# Patient Record
Sex: FEMALE | Race: WHITE | Employment: PART TIME | URBAN - NONMETROPOLITAN AREA
[De-identification: names, ages, dates, MRNs, and addresses within clinical notes are randomized per-mention and may not be internally consistent; named-entity substitution may affect disease eponyms.]

---

## 2021-02-24 ENCOUNTER — TELEPHONE (OUTPATIENT)
Dept: CARDIOLOGY CLINIC | Age: 76
End: 2021-02-24

## 2021-02-24 NOTE — TELEPHONE ENCOUNTER
Patient's podiatrist called Dr. Sue Spence and wanted patient to see Dr. Sue Spence due to discoloration in his toes. I called patient and appt made. She confirmed appt date, time and location.

## 2021-02-25 ENCOUNTER — OFFICE VISIT (OUTPATIENT)
Dept: CARDIOLOGY CLINIC | Age: 76
End: 2021-02-25
Payer: MEDICARE

## 2021-02-25 VITALS
BODY MASS INDEX: 25.56 KG/M2 | DIASTOLIC BLOOD PRESSURE: 72 MMHG | HEIGHT: 65 IN | SYSTOLIC BLOOD PRESSURE: 136 MMHG | HEART RATE: 84 BPM | WEIGHT: 153.4 LBS

## 2021-02-25 DIAGNOSIS — R06.02 SOB (SHORTNESS OF BREATH): Primary | ICD-10-CM

## 2021-02-25 DIAGNOSIS — R94.31 ABNORMAL EKG: ICD-10-CM

## 2021-02-25 DIAGNOSIS — L81.9 DISCOLORATION OF SKIN OF TOE: ICD-10-CM

## 2021-02-25 PROCEDURE — 99204 OFFICE O/P NEW MOD 45 MIN: CPT | Performed by: INTERNAL MEDICINE

## 2021-02-25 PROCEDURE — 93000 ELECTROCARDIOGRAM COMPLETE: CPT | Performed by: INTERNAL MEDICINE

## 2021-02-25 RX ORDER — TRAMADOL HYDROCHLORIDE 50 MG/1
50 TABLET ORAL 4 TIMES DAILY
COMMUNITY

## 2021-02-25 NOTE — PROGRESS NOTES
[facility] [address] [place] [address]  Dept: [phone]  Dept Fax: [phone]  Loc: [phone]    Visit Date: 2/25/2021    Ms. Rosetta Mcintosh is a 77 y.o. female  who presented for:  Chief Complaint   Patient presents with    New Patient     referred by Dr. Tracey Perez; toe discoloration with pain       HPI:   HPI   New patient who is 78 yo with pmhx of anxiety is coming in for discoloration of toes and bilateral pain. Patinet states she has discoloration of R toes starting 1 month ago and L toes starting 2 weeks ago. Discoloration does get worse when foot is down and gets lighter however completely not pale when elevated. Patient states she also has shooting pain down the foot. SULY was done that showed normal ankle-bracial indices but abnormal toe-bracial indces. Patient denies any temperature chage, swelling, nonhealing ulcer or woudns. Was a smoker for 30+ year but has quit for about 20 years now. Denies any there complaints. No a/t. No trauma to the toes. Bed sheets cause hypersensitivity. Discoloration is bluish primarily at the tips of the toes. She is taking Tramadol. She has no hx of Afib, no recent vascular instrumentation. Denies any hx of AAA. She has no pain in her knees, but has a history of bilateral knee replacements. No hx of aneurysm in herself or family. SULY on the R 1.1, L 1.0 and TBI on the R 0.32, L 0.37. Current Outpatient Medications:     traMADol (ULTRAM) 50 MG tablet, Take 50 mg by mouth 4 times daily. , Disp: , Rfl:     busPIRone (BUSPAR) 15 MG tablet, Take 15 mg by mouth 2 times daily , Disp: , Rfl:     Past Medical History  Earlyne Dana  has a past medical history of Anxiety and Back pain. Social History  Earlyne Dana  reports that she quit smoking about 16 years ago. She does not have any smokeless tobacco history on file. She reports that she does not drink alcohol or use drugs. Family History  [facility] family history is not on file. There is no family history of bicuspid aortic valve, aneurysms, heart transplant, pacemakers, defibrillators, or sudden cardiac death. Past Surgical History   Past Surgical History:   Procedure Laterality Date    APPENDECTOMY      CATARACT REMOVAL  2016    HYSTERECTOMY      JOINT REPLACEMENT Bilateral     WRIST SURGERY Right     CYST       Review of Systems   Constitutional: Negative for chills and fever  HENT: Negative for congestion, sinus pressure, sneezing and sore throat. Eyes: Negative for pain, discharge, redness and itching. Respiratory: Negative for apnea, cough  Gastrointestinal: Negative for blood in stool, constipation, diarrhea   Endocrine: Negative for cold intolerance, heat intolerance, polydipsia. Genitourinary: Negative for dysuria, enuresis, flank pain and hematuria. Musculoskeletal: Negative for arthralgias, joint swelling and neck pain. Neurological: Negative for numbness and headaches. Psychiatric/Behavioral: Negative for agitation, confusion, decreased concentration and dysphoric mood. Objective:     /72   Pulse 84   Ht 5' 5\" (1.651 m)   Wt 153 lb 6.4 oz (69.6 kg)   BMI 25.53 kg/m²     Wt Readings from Last 3 Encounters:   02/25/21 153 lb 6.4 oz (69.6 kg)   07/23/15 163 lb 14.4 oz (74.3 kg)     BP Readings from Last 3 Encounters:   02/25/21 136/72   07/23/15 117/72       Nursing note and vitals reviewed. Physical Exam   Constitutional: Oriented to person, place, and time. Appears well-developed and well-nourished. HENT:   Head: Normocephalic and atraumatic. Eyes: EOM are normal. Pupils are equal, round, and reactive to light. Neck: Normal range of motion. Neck supple. No JVD present. Cardiovascular: Normal rate, regular rhythm, normal heart sounds and intact distal pulses. No murmur heard. Pulmonary/Chest: Effort normal and breath sounds normal. No respiratory distress. No wheezes. No rales. Abdominal: Soft. Bowel sounds are normal. No distension. There is no tenderness. Musculoskeletal: Normal range of motion. No edema. Bluish discolation in toes bilaterally, mainly at the tips, appears superficial, tender to touch, focal, not all of the toes, spotty. Neurological: Alert and oriented to person, place, and time. No cranial nerve deficit. Coordination normal.   Skin: Skin is warm and dry. Psychiatric: Normal mood and affect. No results found for: CKTOTAL, CKMB, CKMBINDEX    No results found for: WBC, RBC, HGB, HCT, MCV, MCH, MCHC, RDW, PLT, MPV    No results found for: NA, K, CL, CO2, BUN, LABALBU, CREATININE, CALCIUM, GFRAA, LABGLOM, GLUCOSE    No results found for: ALKPHOS, ALT, AST, PROT, BILITOT, BILIDIR, IBILI, LABALBU    No results found for: MG    No results found for: INR, PROTIME      No results found for: LABA1C    No results found for: TRIG, HDL, LDLCALC, LDLDIRECT, LABVLDL    No results found for: TSH      Testing Reviewed:      I have individually reviewed the cardiac test below:    ECHO: No results found for this or any previous visit. Assessment/Plan   ?  Cholesterol Emboli or thrombotic emboli  Blue Toe Syndrome  Abnormal toe-brachial indices  Normal Ankle-brachial indices  Blateral foot pain  Hx of smoking The findings are most consistent with embolization but unclear source. Thrombosis vs cholesterol. Discussed that we need further work-up before we proceed with treating. Pain control is adequate. Will check cardiac event monitor, CBC with diff, TTE/CONCHA,  Angiogram to evaluate for any proximal disease/aneurysmal disease that could need intervention to prevent continued issues, FLP, and UA with eosinophils. If worsening symptoms, will present to the ED for further evaluation. Ultimately, if this is cholesterol emboli, then the treatment is RF modification, ASA, statin therapy. Discussed diet/exercise/BP/weight loss/health lifestyle choices/lipids; the patient understands the goals and will try to comply. Disposition:  Testing then follow-up    Electronically signed by Todd Sykes DO   2/25/2021 at 2:49 PM EST    Attending Supervising Physician's [address] Statement  I performed a history and physical examination on the patient and discussed the management with the resident physician. I reviewed and agree with the findings and plan as documented in the resident's note.     Electronically signed by Diane Hernandez MD on 2/26/21 at 7:20 AM EST

## 2021-03-01 ENCOUNTER — TELEPHONE (OUTPATIENT)
Dept: CARDIOLOGY CLINIC | Age: 76
End: 2021-03-01

## 2021-03-01 NOTE — TELEPHONE ENCOUNTER
Lab faxed to [address] [place] [address] lab   They stated they had a UA order   Pt will go back to lab and get a new kit for a redo

## 2021-03-11 ENCOUNTER — PREP FOR PROCEDURE (OUTPATIENT)
Dept: CARDIOLOGY | Age: 76
End: 2021-03-11

## 2021-03-11 RX ORDER — SODIUM CHLORIDE 0.9 % (FLUSH) 0.9 %
10 SYRINGE (ML) INJECTION EVERY 12 HOURS SCHEDULED
Status: CANCELLED | OUTPATIENT
Start: 2021-03-11

## 2021-03-11 RX ORDER — SODIUM CHLORIDE 0.9 % (FLUSH) 0.9 %
10 SYRINGE (ML) INJECTION PRN
Status: CANCELLED | OUTPATIENT
Start: 2021-03-11

## 2021-03-11 RX ORDER — SODIUM CHLORIDE 9 MG/ML
50 INJECTION, SOLUTION INTRAVENOUS CONTINUOUS
Status: CANCELLED | OUTPATIENT
Start: 2021-03-11

## 2021-03-12 ENCOUNTER — HOSPITAL ENCOUNTER (OUTPATIENT)
Dept: NON INVASIVE DIAGNOSTICS | Age: 76
Discharge: HOME | End: 2021-03-12
Payer: MEDICARE

## 2021-03-12 ENCOUNTER — HOSPITAL ENCOUNTER (OUTPATIENT)
Dept: INTERVENTIONAL RADIOLOGY/VASCULAR | Age: 76
Discharge: HOME | End: 2021-03-12
Attending: INTERNAL MEDICINE | Admitting: INTERNAL MEDICINE
Payer: MEDICARE

## 2021-03-12 VITALS
WEIGHT: 150 LBS | BODY MASS INDEX: 24.99 KG/M2 | TEMPERATURE: 98.4 F | HEART RATE: 87 BPM | DIASTOLIC BLOOD PRESSURE: 79 MMHG | SYSTOLIC BLOOD PRESSURE: 146 MMHG | HEIGHT: 65 IN | OXYGEN SATURATION: 100 % | RESPIRATION RATE: 17 BRPM

## 2021-03-12 DIAGNOSIS — R06.02 SOB (SHORTNESS OF BREATH): ICD-10-CM

## 2021-03-12 DIAGNOSIS — L81.9 DISCOLORATION OF SKIN OF TOE: ICD-10-CM

## 2021-03-12 DIAGNOSIS — R94.31 ABNORMAL EKG: ICD-10-CM

## 2021-03-12 LAB
ABO: NORMAL
ALBUMIN SERPL-MCNC: 3.9 G/DL (ref 3.5–5.1)
ALP BLD-CCNC: 53 U/L (ref 38–126)
ALT SERPL-CCNC: 19 U/L (ref 11–66)
ANION GAP SERPL CALCULATED.3IONS-SCNC: 9 MEQ/L (ref 8–16)
ANTIBODY SCREEN: NORMAL
APTT: 31.7 SECONDS (ref 22–38)
AST SERPL-CCNC: 26 U/L (ref 5–40)
BILIRUB SERPL-MCNC: 0.4 MG/DL (ref 0.3–1.2)
BUN BLDV-MCNC: 27 MG/DL (ref 7–22)
CALCIUM SERPL-MCNC: 9 MG/DL (ref 8.5–10.5)
CHLORIDE BLD-SCNC: 106 MEQ/L (ref 98–111)
CHOLESTEROL, TOTAL: 198 MG/DL (ref 100–199)
CO2: 27 MEQ/L (ref 23–33)
CREAT SERPL-MCNC: 0.8 MG/DL (ref 0.4–1.2)
ERYTHROCYTE [DISTWIDTH] IN BLOOD BY AUTOMATED COUNT: 14.4 % (ref 11.5–14.5)
ERYTHROCYTE [DISTWIDTH] IN BLOOD BY AUTOMATED COUNT: 47.3 FL (ref 35–45)
GFR SERPL CREATININE-BSD FRML MDRD: 70 ML/MIN/1.73M2
GLUCOSE BLD-MCNC: 84 MG/DL (ref 70–108)
HCT VFR BLD CALC: 40.8 % (ref 37–47)
HDLC SERPL-MCNC: 90 MG/DL
HEMOGLOBIN: 12.7 GM/DL (ref 12–16)
INR BLD: 1.02 (ref 0.85–1.13)
LDL CHOLESTEROL CALCULATED: 97 MG/DL
MCH RBC QN AUTO: 28.2 PG (ref 26–33)
MCHC RBC AUTO-ENTMCNC: 31.1 GM/DL (ref 32.2–35.5)
MCV RBC AUTO: 90.7 FL (ref 81–99)
PLATELET # BLD: 257 THOU/MM3 (ref 130–400)
PMV BLD AUTO: 10.2 FL (ref 9.4–12.4)
POTASSIUM REFLEX MAGNESIUM: 4.2 MEQ/L (ref 3.5–5.2)
RBC # BLD: 4.5 MILL/MM3 (ref 4.2–5.4)
RH FACTOR: NORMAL
SODIUM BLD-SCNC: 142 MEQ/L (ref 135–145)
TOTAL PROTEIN: 6.4 G/DL (ref 6.1–8)
TRIGL SERPL-MCNC: 57 MG/DL (ref 0–199)
WBC # BLD: 5.6 THOU/MM3 (ref 4.8–10.8)

## 2021-03-12 PROCEDURE — 6360000002 HC RX W HCPCS: Performed by: INTERNAL MEDICINE

## 2021-03-12 PROCEDURE — 80053 COMPREHEN METABOLIC PANEL: CPT

## 2021-03-12 PROCEDURE — 6360000002 HC RX W HCPCS

## 2021-03-12 PROCEDURE — 85610 PROTHROMBIN TIME: CPT

## 2021-03-12 PROCEDURE — 93270 REMOTE 30 DAY ECG REV/REPORT: CPT

## 2021-03-12 PROCEDURE — C1894 INTRO/SHEATH, NON-LASER: HCPCS

## 2021-03-12 PROCEDURE — 2580000003 HC RX 258: Performed by: NURSE PRACTITIONER

## 2021-03-12 PROCEDURE — 86900 BLOOD TYPING SEROLOGIC ABO: CPT

## 2021-03-12 PROCEDURE — 75774 ARTERY X-RAY EACH VESSEL: CPT | Performed by: INTERNAL MEDICINE

## 2021-03-12 PROCEDURE — 86850 RBC ANTIBODY SCREEN: CPT

## 2021-03-12 PROCEDURE — 86901 BLOOD TYPING SEROLOGIC RH(D): CPT

## 2021-03-12 PROCEDURE — 36415 COLL VENOUS BLD VENIPUNCTURE: CPT

## 2021-03-12 PROCEDURE — 6360000004 HC RX CONTRAST MEDICATION: Performed by: INTERNAL MEDICINE

## 2021-03-12 PROCEDURE — 75625 CONTRAST EXAM ABDOMINL AORTA: CPT | Performed by: INTERNAL MEDICINE

## 2021-03-12 PROCEDURE — 36247 INS CATH ABD/L-EXT ART 3RD: CPT | Performed by: INTERNAL MEDICINE

## 2021-03-12 PROCEDURE — 80061 LIPID PANEL: CPT

## 2021-03-12 PROCEDURE — 2500000003 HC RX 250 WO HCPCS

## 2021-03-12 PROCEDURE — 85730 THROMBOPLASTIN TIME PARTIAL: CPT

## 2021-03-12 PROCEDURE — 2709999900 HC NON-CHARGEABLE SUPPLY

## 2021-03-12 PROCEDURE — 75716 ARTERY X-RAYS ARMS/LEGS: CPT | Performed by: INTERNAL MEDICINE

## 2021-03-12 PROCEDURE — C1769 GUIDE WIRE: HCPCS

## 2021-03-12 PROCEDURE — 85027 COMPLETE CBC AUTOMATED: CPT

## 2021-03-12 PROCEDURE — 75630 X-RAY AORTA LEG ARTERIES: CPT | Performed by: INTERNAL MEDICINE

## 2021-03-12 RX ORDER — RIVAROXABAN 2.5 MG/1
2.5 TABLET, FILM COATED ORAL 2 TIMES DAILY
Qty: 60 TABLET | Refills: 3 | Status: SHIPPED | OUTPATIENT
Start: 2021-03-12 | End: 2021-03-26 | Stop reason: SDUPTHER

## 2021-03-12 RX ORDER — SODIUM CHLORIDE 0.9 % (FLUSH) 0.9 %
10 SYRINGE (ML) INJECTION EVERY 12 HOURS SCHEDULED
Status: DISCONTINUED | OUTPATIENT
Start: 2021-03-12 | End: 2021-03-12 | Stop reason: HOSPADM

## 2021-03-12 RX ORDER — ATORVASTATIN CALCIUM 40 MG/1
40 TABLET, FILM COATED ORAL DAILY
Qty: 30 TABLET | Refills: 3 | Status: SHIPPED | OUTPATIENT
Start: 2021-03-12 | End: 2021-03-26 | Stop reason: SDUPTHER

## 2021-03-12 RX ORDER — ACETAMINOPHEN 325 MG/1
650 TABLET ORAL EVERY 4 HOURS PRN
Status: DISCONTINUED | OUTPATIENT
Start: 2021-03-12 | End: 2021-03-12 | Stop reason: HOSPADM

## 2021-03-12 RX ORDER — SODIUM CHLORIDE 9 MG/ML
INJECTION, SOLUTION INTRAVENOUS CONTINUOUS
Status: DISCONTINUED | OUTPATIENT
Start: 2021-03-12 | End: 2021-03-12 | Stop reason: HOSPADM

## 2021-03-12 RX ORDER — SODIUM CHLORIDE 0.9 % (FLUSH) 0.9 %
10 SYRINGE (ML) INJECTION EVERY 12 HOURS SCHEDULED
Status: DISCONTINUED | OUTPATIENT
Start: 2021-03-12 | End: 2021-03-12 | Stop reason: ALTCHOICE

## 2021-03-12 RX ORDER — ASPIRIN 81 MG/1
81 TABLET ORAL DAILY
Qty: 90 TABLET | Refills: 1 | Status: SHIPPED | OUTPATIENT
Start: 2021-03-12 | End: 2021-03-26 | Stop reason: SDUPTHER

## 2021-03-12 RX ORDER — ISOSORBIDE MONONITRATE 30 MG/1
30 TABLET, EXTENDED RELEASE ORAL DAILY
Qty: 30 TABLET | Refills: 3 | Status: SHIPPED | OUTPATIENT
Start: 2021-03-12 | End: 2021-03-26 | Stop reason: SDUPTHER

## 2021-03-12 RX ORDER — SODIUM CHLORIDE 0.9 % (FLUSH) 0.9 %
10 SYRINGE (ML) INJECTION PRN
Status: DISCONTINUED | OUTPATIENT
Start: 2021-03-12 | End: 2021-03-12 | Stop reason: ALTCHOICE

## 2021-03-12 RX ORDER — AMLODIPINE BESYLATE 2.5 MG/1
2.5 TABLET ORAL DAILY
Qty: 30 TABLET | Refills: 3 | Status: SHIPPED | OUTPATIENT
Start: 2021-03-12 | End: 2021-03-26 | Stop reason: SDUPTHER

## 2021-03-12 RX ORDER — SODIUM CHLORIDE 0.9 % (FLUSH) 0.9 %
10 SYRINGE (ML) INJECTION PRN
Status: DISCONTINUED | OUTPATIENT
Start: 2021-03-12 | End: 2021-03-12 | Stop reason: HOSPADM

## 2021-03-12 RX ORDER — SODIUM CHLORIDE 9 MG/ML
50 INJECTION, SOLUTION INTRAVENOUS CONTINUOUS
Status: DISCONTINUED | OUTPATIENT
Start: 2021-03-12 | End: 2021-03-12 | Stop reason: ALTCHOICE

## 2021-03-12 RX ORDER — ATROPINE SULFATE 0.4 MG/ML
0.5 AMPUL (ML) INJECTION
Status: DISCONTINUED | OUTPATIENT
Start: 2021-03-12 | End: 2021-03-12 | Stop reason: HOSPADM

## 2021-03-12 RX ORDER — MIDAZOLAM HYDROCHLORIDE 2 MG/2ML
1 INJECTION, SOLUTION INTRAMUSCULAR; INTRAVENOUS ONCE
Status: COMPLETED | OUTPATIENT
Start: 2021-03-12 | End: 2021-03-12

## 2021-03-12 RX ORDER — CLOPIDOGREL BISULFATE 75 MG/1
75 TABLET ORAL DAILY
Qty: 30 TABLET | Refills: 3 | Status: SHIPPED | OUTPATIENT
Start: 2021-03-12 | End: 2021-03-26 | Stop reason: SDUPTHER

## 2021-03-12 RX ORDER — FENTANYL CITRATE 50 UG/ML
50 INJECTION, SOLUTION INTRAMUSCULAR; INTRAVENOUS ONCE
Status: COMPLETED | OUTPATIENT
Start: 2021-03-12 | End: 2021-03-12

## 2021-03-12 RX ADMIN — SODIUM CHLORIDE 50 ML/HR: 9 INJECTION, SOLUTION INTRAVENOUS at 09:46

## 2021-03-12 RX ADMIN — MIDAZOLAM 1 MG: 1 INJECTION INTRAMUSCULAR; INTRAVENOUS at 14:50

## 2021-03-12 RX ADMIN — IOPAMIDOL 100 ML: 612 INJECTION, SOLUTION INTRAVENOUS at 15:19

## 2021-03-12 RX ADMIN — FENTANYL CITRATE 50 MCG: 50 INJECTION INTRAMUSCULAR; INTRAVENOUS at 14:49

## 2021-03-12 NOTE — PRE SEDATION
[address] [address]  Sedation/Analgesia History & Physical    Pt Name: Magaly Mariano  Account number: [de-identified]  MRN: [id number]  YOB: 1945  Provider Performing Procedure: Raymond Lemus MD  Referring Provider: Raymond Lemus MD   Primary Care Physician: Diana Lopez  Date: 3/12/2021    PRE-PROCEDURE    Code Status: FULL CODE  Brief History/Pre-Procedure Diagnosis:   ? Cholesterol embolic  Blue color changes of the toes    Consent: : I have discussed with the patient risks, benefits, and alternatives (and relevant risks, benefits, and side effects related to alternatives or not receiving care), and likelihood of the success. The patient and/or representative appear to understand and agree to proceed. The discussion encompasses risks, benefits, and side effects related to the alternatives and the risks related to not receiving the proposed care, treatment, and services. The indication, risks and benefits of the procedure and possible therapeutic consequences and alternatives were discussed with the patient. The patient was given the opportunity to ask questions and to have them answered to his/her satisfaction. Risks of the procedure include but are not limited to the following: Bleeding, hematoma including retroperitoneal hemmorhage, infection, pain and discomfort, injury to the aorta and other blood vessels, rhythm disturbance, low blood pressure, myocardial infarction, stroke, kidney damage/failure, myocardial perforation, allergic reactions to sedatives/contrast material, loss of pulse/vascular compromise requiring surgery, aneurysm/pseudoaneurysm formation, possible loss of a limb/hand/leg, needing blood transfusion, requiring emergent open heart surgery or emergent coronary intervention, the need for intubation/respiratory support, the requirement for defibrillation/cardioversion, and death. Alternatives to and omission of the suggested procedure were discussed.  The patient had no further questions and wished to proceed; the consent form was signed. MEDICAL HISTORY  []ASHD/ANGINA/MI/CHF   []Hypertension  []Diabetes  []Hyperlipidemia  []Smoking  []Family Hx of ASHD  [x]Additional information:       has a past medical history of Anxiety and Back pain. SURGICAL HISTORY   has a past surgical history that includes joint replacement (Bilateral); Wrist surgery (Right); Hysterectomy; Appendectomy; and Cataract removal (2016). Additional information:       ALLERGIES   Allergies as of 03/12/2021    (No Known Allergies)     Additional information:       MEDICATIONS   Aspirin  [x] 81 mg  [] 325 mg  [] None  Antiplatelet drug therapy use last 5 days  [x]No []Yes  Coumadin Use Last 5 Days [x]No []Yes  Other anticoagulant use last 5 days  [x]No []Yes    Current Facility-Administered Medications:     0.9 % sodium chloride infusion, 50 mL/hr, Intravenous, Continuous, Dorlene Kurt, APRN - CNP, Last Rate: 50 mL/hr at 03/12/21 0946, 50 mL/hr at 03/12/21 0946    sodium chloride flush 0.9 % injection 10 mL, 10 mL, Intravenous, 2 times per day, Dorlene Kurt, APRN - CNP    sodium chloride flush 0.9 % injection 10 mL, 10 mL, Intravenous, PRN, Dorlene Kurt, APRN - CNP    midazolam PF (VERSED) injection 1 mg, 1 mg, Intravenous, Once, Gloria Shaver MD    fentaNYL (SUBLIMAZE) injection 50 mcg, 50 mcg, Intravenous, Once, Gloria Shaver MD    iopamidol (ISOVUE-300) 61 % injection 200 mL, 200 mL, Intravenous, ONCE PRN, Gloria Shaver MD  Prior to Admission medications    Medication Sig Start Date End Date Taking? Authorizing Provider   traMADol (ULTRAM) 50 MG tablet Take 50 mg by mouth 4 times daily.    Yes Historical Provider, MD   busPIRone (BUSPAR) 15 MG tablet Take 15 mg by mouth 2 times daily    Yes Historical Provider, MD     Additional information:       VITAL SIGNS   Vitals:    03/12/21 0918   BP: (!) 148/68   Pulse:    Resp:    Temp:    SpO2: PHYSICAL:   General: No acute distress  HEENT:  Unremarkable for age  Neck: without increased JVD, carotid pulses 2+ bilaterally without bruits  Heart: RRR, S1 & S2 WNL, S4 gallop, without murmurs or rubs   NYHA: 2}   Lungs: Clear to auscultation    Abdomen: BS present, without HSM, masses, or tendernes    Extremities: without C,C,E.  Pulses 2+ bilaterally  Mental Status: Alert & Oriented        PLANNED PROCEDURE   []Cath  []PCI                []Pacemaker/AICD  []CONCHA             []Cardioversion [x]Peripheral angiography/PTA  []Other:      SEDATION  Planned agent:[x]Midazolam []Meperidine [x]Sublimaze []Morphine  []Diazepam  []Other:     ASA Classification:  []1 []2 [x]3 []4 []5  Class 1: A normal healthy patient  Class 2: Pt with mild to moderate systemic disease  Class 3: Severe systemic disease or disturbance  Class 4: Severe systemic disorders that are already life threatening. Class 5: Moribund pt with little chances of survival, for more than 24 hours. Mallampati I Airway Classification:   []1 [x]2 []3 []4    [x]Pre-procedure diagnostic studies complete and results available. Comment:    [x]Previous sedation/anesthesia experiences assessed. Comment:    [x]The patient is an appropriate candidate to undergo the planned procedure sedation and anesthesia. (Refer to nursing sedation/analgesia documentation record)  [x]Formulation and discussion of sedation/procedure plan, risks, and expectations with patient and/or responsible adult completed. [x]Patient examined immediately prior to the procedure.  (Refer to nursing sedation/analgesia documentation record)    Otto Whittaker MD   Electronically signed 3/12/2021 at 1:36 PM

## 2021-03-12 NOTE — FLOWSHEET NOTE
Returned to room from [facility] with dressing to right groin that is dry and intact w/o s/s of bleeding or hematoma. Good circulation/sensation, as previously noted, on arrival to unit. Denied c/o pain or discomfort. Skin warm and dry. Iv infusing w/o s/s of infiltration.

## 2021-03-12 NOTE — BRIEF OP NOTE
[facility]  Sedation/Analgesia Post Sedation Record    Pt Name: Jenna Oneal  Account number: [de-identified]  MRN: [id number]  YOB: 1945  Procedure Performed By: Kacie Drew, [address]  Primary Care Physician: Vladimir Ortiz  Date: 3/12/2021    POST-PROCEDURE    Physicians/Assistants: KAILA Pickering MD    Procedure Performed:Peripheral Angiogram      Sedation/Anesthesia: Versed/ Fentanyl and 2% xylocaine local anesthesia. Estimated Blood Loss: < 50 ml. Specimens Removed: None         Disposition of Specimen: N/A        Complications: No Immediate Complications.        Post-procedure Diagnosis/Findings:     Abd aorta with atheromatous disease  No significant inflow/outflow/trifurcation disease  Distal pedal disease in both feet    Likely related to cholesterol embolization               KAILA Pickering MD  Electronically signed 3/12/2021 at 3:19 PM  Interventional Cardiology

## 2021-03-12 NOTE — PROGRESS NOTES
1420 Patient received in IR for procedure with family taken to main radiology. 1425 This procedure has been fully reviewed with the patient and written informed consent has been obtained. Dr. Pk Arreola in; spoke to patient. 1452 Procedure started with Dr. Pk Arreola. 1453 Access left femoral.   1515 Family called to consult room. Procedure completed; patient tolerated well.   1522 Sheath pulled. Manual pressure held. Dr. Pk Arreola spoke to patient's family. Report called to 2E.  1557 Manual pressure released. Gauze and op site to left femoral site; area soft to touch with no bleeding noted. 1600 All 4 pedal pulses located by doppler by RT Feli.  1602 Patient on bed; comfort ensured. Left femoral dressing remains dry and intact with area soft. [id number] Patient taken to 2E via bed with family at bedside. Left femoral dressing remains dry and intact with area soft.

## 2021-03-13 NOTE — PROCEDURES
30 day cardiac event monitor applied. Instructions given. Prep taught. NO further questions.   Jamin, CCT

## 2021-03-15 NOTE — PROCEDURES
[address] [place] [address]                            CARDIAC CATHETERIZATION    PATIENT NAME: Belkis Arce                 :        1945  MED REC NO:   [id number]                           ROOM:       0009  ACCOUNT NO:   [de-identified]                           ADMIT DATE: 2021  PROVIDER:     Jahaira Piña MD    DATE OF PROCEDURE:  2021    INDICATION FOR PROCEDURE:  Blue toe syndrome concerning for cholesterol  embolization. PROCEDURE:  After informed consent was obtained from the patient, she  was brought to the special procedure suite and prepped in the sterile  fashion. The left femoral artery was chosen as primary point of access. Preprocedure timeout was completed. After infiltration to the left  inguinal region with 2% lidocaine using micropuncture and modified  Seldinger technique, fluoroscopic guidance, and ultrasound guidance, I  was able to insert a 5-Mozambican sheath in the left femoral artery. Angiography was performed after inserting a 5-Mozambican VCF catheter over  0.035 angled Glidewire into the abdominal aorta. PERIPHERAL ANGIOGRAM:  ABDOMINAL AORTA:  Diffuse atheromatous disease throughout the entire  distal abdominal aorta. There is significant plaque and heterogeneity  with atheroma on the abdominal wall. No significant aneurysm noted. RENAL ARTERIES:  Difficult to opacify. Would recommend  catheter-directed angiography. COMMON ILIAC ARTERIES:  Bilaterally patent. EXTERNAL ILIAC ARTERIES:  Bilaterally patent; however, the right common  iliac artery has atheromatous disease in the proximal segment. COMMON FEMORAL ARTERIES:  Bilaterally patent. SFA:  Bilaterally patent. POPLITEAL ARTERIES:  Bilaterally patent. TIBIOPERONEAL ARTERIES:  Three-vessel runoff to the ankle bilaterally. I then used a 5-Mozambican VCF catheter and 0.035 angled Glidewire.   I  attempted to cross over, but I could not. I ultimately used a 5-Kenyan  IM catheter. I was able to cross them and place the catheter in the  distal SFA. I did dedicate angiography behind the knee prosthesis to  ensure that there was no popliteal artery aneurysm, which there was not. Distal vessel runoff was unchanged; however, pedal runoff was poor. LEFT PEDAL ARTERIES:  Very slow flow into the left foot, but there is  flow beyond the ankle. It appears to be consistent with distal  embolization. RIGHT PEDAL ARTERIES:  Very slow flow into the right foot. There is  flow to the ankle and flow into the foot but again appears to be  consistent with distal microvascular disease or embolization. IMMEDIATE COMPLICATIONS:  None. MEDICATIONS:  Please see EMR. ACCESS:  Manual pressure was used for hemostasis. ESTIMATED BLOOD LOSS:  Less than 10 mL. SUMMARY:  Findings consistent with small-vessel microvasculature disease  in the distal foot that is most consistent with likely cholesterol  embolization, especially in the setting of a diffusely diseased aorta  as well as the findings of bilateral blue toe syndrome. PLAN:  1. Bedrest.  2.  Start optimal medical therapy. 3.  Risk factor management. 4.  Routine access site care. 5.  Manual pressure for the groin. 6.  Start guideline-directed therapy for likely cholesterol  embolization. 7.  We will start aspirin, statin, Plavix, Xarelto 2.5 mg, Imdur, and  calcium channel blockers. 8.  She is to watch the foot to ensure there is no progressive wound or  lesion noted. 9.  She will follow up with her podiatrist.  10.  If there is any progression in wounds or color change, she is to  notify us immediately. 11.  I did explain to her that no further intervention is likely going  to solve her issue. The hope is that we would stabilize any atheroma and  prevent any further embolization. 12.   She may have continued pain, and there is even chance for distal  digital necrosis requiring toe amputation; however, she continues to  have a pulse and a warm foot all the way into the tip of the toes, which  hopefully will improve with medical therapy. 13.  IV fluids. 14.  Follow up two to four weeks postprocedure. All the above was explained to the patient and the patient's family. They are agreeable and amenable to the above plan.         Verner Basket, MD    D: 03/15/2021 6:42:18       T: 03/15/2021 8:44:25     [email or web address]  Job#: [id number]     Doc#: [id number]    CC:

## 2021-03-16 ENCOUNTER — TELEPHONE (OUTPATIENT)
Dept: CARDIOLOGY CLINIC | Age: 76
End: 2021-03-16

## 2021-03-16 ENCOUNTER — CARE COORDINATION (OUTPATIENT)
Dept: CARE COORDINATION | Age: 76
End: 2021-03-16

## 2021-03-16 NOTE — CARE COORDINATION
I spoke with Dank (son and POA) regarding patient assistance on Elle's Xarelto. I explained the program and guidelines to him, he is going to check with her to see how much she has spent at the pharmacy so far. He is going to reach back out to me when she has met the guidelines and I will start the application process for her.     Luigi Orr [facility]  [address] [place]   Medication Assistance  [facility]    [phone])[phone])[phone]

## 2021-03-16 NOTE — TELEPHONE ENCOUNTER
Pt son asking if pt can get any help with the cost of xarelto, as pt was put on 7 new medications. Radha Pelayo can you help pt?

## 2021-03-26 RX ORDER — ATORVASTATIN CALCIUM 40 MG/1
40 TABLET, FILM COATED ORAL DAILY
Qty: 90 TABLET | Refills: 1 | Status: SHIPPED | OUTPATIENT
Start: 2021-03-26 | End: 2021-04-09 | Stop reason: SDUPTHER

## 2021-03-26 RX ORDER — ISOSORBIDE MONONITRATE 30 MG/1
30 TABLET, EXTENDED RELEASE ORAL DAILY
Qty: 90 TABLET | Refills: 1 | Status: SHIPPED | OUTPATIENT
Start: 2021-03-26 | End: 2021-04-09 | Stop reason: SDUPTHER

## 2021-03-26 RX ORDER — RIVAROXABAN 2.5 MG/1
2.5 TABLET, FILM COATED ORAL 2 TIMES DAILY
Qty: 180 TABLET | Refills: 0 | Status: SHIPPED | OUTPATIENT
Start: 2021-03-26 | End: 2021-04-09 | Stop reason: SDUPTHER

## 2021-03-26 RX ORDER — ASPIRIN 81 MG/1
81 TABLET ORAL DAILY
Qty: 90 TABLET | Refills: 1 | Status: SHIPPED | OUTPATIENT
Start: 2021-03-26

## 2021-03-26 RX ORDER — AMLODIPINE BESYLATE 2.5 MG/1
2.5 TABLET ORAL DAILY
Qty: 90 TABLET | Refills: 1 | Status: SHIPPED | OUTPATIENT
Start: 2021-03-26 | End: 2021-04-09 | Stop reason: SDUPTHER

## 2021-03-26 RX ORDER — CLOPIDOGREL BISULFATE 75 MG/1
75 TABLET ORAL DAILY
Qty: 90 TABLET | Refills: 2 | Status: SHIPPED | OUTPATIENT
Start: 2021-03-26 | End: 2021-04-09 | Stop reason: SDUPTHER

## 2021-03-31 ENCOUNTER — TELEPHONE (OUTPATIENT)
Dept: CARDIOLOGY CLINIC | Age: 76
End: 2021-03-31

## 2021-04-10 RX ORDER — ATORVASTATIN CALCIUM 40 MG/1
40 TABLET, FILM COATED ORAL DAILY
Qty: 4 TABLET | Refills: 0 | OUTPATIENT
Start: 2021-04-10

## 2021-04-10 RX ORDER — CLOPIDOGREL BISULFATE 75 MG/1
75 TABLET ORAL DAILY
Qty: 4 TABLET | Refills: 0 | OUTPATIENT
Start: 2021-04-10

## 2021-04-10 RX ORDER — RIVAROXABAN 2.5 MG/1
2.5 TABLET, FILM COATED ORAL 2 TIMES DAILY
Qty: 8 TABLET | Refills: 0 | OUTPATIENT
Start: 2021-04-10 | End: 2021-07-01 | Stop reason: ALTCHOICE

## 2021-04-10 RX ORDER — ISOSORBIDE MONONITRATE 30 MG/1
30 TABLET, EXTENDED RELEASE ORAL DAILY
Qty: 4 TABLET | Refills: 0 | OUTPATIENT
Start: 2021-04-10

## 2021-04-10 RX ORDER — AMLODIPINE BESYLATE 2.5 MG/1
2.5 TABLET ORAL DAILY
Qty: 4 TABLET | Refills: 0 | OUTPATIENT
Start: 2021-04-10

## 2021-04-13 NOTE — PROCEDURES
[address] [place] [address]                                 EVENT MONITOR    PATIENT NAME: Reena Rao                 :        1945  MED REC NO:   [id number]                           ROOM:       0009  ACCOUNT NO:   [de-identified]                           ADMIT DATE: 2021  PROVIDER:     Rossy Zurita MD    TEST TYPE:  Two-week event monitor. INDICATION FOR STUDY:  Cardiac arrhythmia, shortness of breath. FINDINGS:  Baseline rhythm is sinus rhythm with PVCs. There are  multiple manually detected events of PACs and sinus tachycardia. At  times, PACs were correlated with tiredness and fatigue, but other times  they did not correlate with any symptoms. No findings of atrial  fibrillation or flutter noted. No findings of high-grade AV block. No  NSVT, VT, or VF.    SUMMARY:  Intermittently symptomatic PACs.         Suha Fierro MD    D: 2021 16:25:24       T: 2021 18:48:20     [email or web address]  Job#: [id number]     Doc#: [id number]    CC:

## 2021-04-27 NOTE — TELEPHONE ENCOUNTER
Results of event monitor    SUMMARY:  Intermittently symptomatic PACs. Did you want to add any medications?

## 2021-04-28 RX ORDER — METOPROLOL SUCCINATE 25 MG/1
25 TABLET, EXTENDED RELEASE ORAL DAILY
Qty: 30 TABLET | Refills: 3 | Status: SHIPPED | OUTPATIENT
Start: 2021-04-28 | End: 2021-05-17 | Stop reason: SDUPTHER

## 2021-05-17 RX ORDER — METOPROLOL SUCCINATE 25 MG/1
25 TABLET, EXTENDED RELEASE ORAL DAILY
Qty: 90 TABLET | Refills: 3 | Status: SHIPPED | OUTPATIENT
Start: 2021-05-17

## 2021-05-17 NOTE — TELEPHONE ENCOUNTER
Pt asking how long it will take for the medicine to work. Pt states she can not do anything with out being out of breath.

## 2021-06-30 ENCOUNTER — TELEPHONE (OUTPATIENT)
Dept: CARDIOLOGY CLINIC | Age: 76
End: 2021-06-30

## 2021-06-30 NOTE — TELEPHONE ENCOUNTER
Pt was admitted hemoglobin was 8.4, they gave her 2 pints of blood, stool was occult positive, they want to know what thinner could be stopped, and would like to do a colonoscopy on her. Asking if you could call Dr. Gunjan Felix at [phone] to discuss.

## 2021-07-01 NOTE — TELEPHONE ENCOUNTER
Spoke to pt. She states her toe is much better. Pt notified to stop Xarelto. Med list updated. Pt states Dr. Giselle Sánchez has held her Plavix as well. She is still on ASA. He wants to do a Colonoscopy. Did you talk to Dr. Giselle Sánchez? Okay to continue to hold Plavix?